# Patient Record
Sex: FEMALE | Race: WHITE | Employment: UNEMPLOYED | URBAN - NONMETROPOLITAN AREA
[De-identification: names, ages, dates, MRNs, and addresses within clinical notes are randomized per-mention and may not be internally consistent; named-entity substitution may affect disease eponyms.]

---
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
## 2023-05-12 ENCOUNTER — TELEPHONE (OUTPATIENT)
Dept: PEDIATRICS | Age: 18
End: 2023-05-12

## 2023-05-12 NOTE — TELEPHONE ENCOUNTER
I called [employer] to check on if a prior auth was needed for [id number]/[id number]. Through the automated system it stated that: \"if physician is In Network - no prior auth was needed\"  I checked with Dr Sumner! [facility] office and he is In Network for [employer]. Call reference # is [id number].

## 2023-05-18 ENCOUNTER — HOSPITAL ENCOUNTER (OUTPATIENT)
Dept: PEDIATRICS | Age: 18
Discharge: HOME | End: 2023-05-18
Payer: COMMERCIAL

## 2023-05-18 VITALS
DIASTOLIC BLOOD PRESSURE: 60 MMHG | HEART RATE: 68 BPM | BODY MASS INDEX: 27.14 KG/M2 | TEMPERATURE: 97.4 F | OXYGEN SATURATION: 98 % | SYSTOLIC BLOOD PRESSURE: 126 MMHG | HEIGHT: 63 IN | WEIGHT: 153.2 LBS | RESPIRATION RATE: 14 BRPM

## 2023-05-18 DIAGNOSIS — Q25.1 VSD (VENTRICULAR SEPTAL DEFECT) AND COARCTATION OF AORTA: Primary | ICD-10-CM

## 2023-05-18 DIAGNOSIS — Q21.0 VSD (VENTRICULAR SEPTAL DEFECT) AND COARCTATION OF AORTA: Primary | ICD-10-CM

## 2023-05-18 PROCEDURE — 93005 ELECTROCARDIOGRAM TRACING: CPT | Performed by: INTERNAL MEDICINE

## 2023-05-18 PROCEDURE — 93303 ECHO TRANSTHORACIC: CPT

## 2023-05-18 PROCEDURE — 93325 DOPPLER ECHO COLOR FLOW MAPG: CPT

## 2023-05-18 PROCEDURE — 99212 OFFICE O/P EST SF 10 MIN: CPT

## 2023-05-18 PROCEDURE — 93320 DOPPLER ECHO COMPLETE: CPT

## 2023-05-18 RX ORDER — NORGESTIMATE AND ETHINYL ESTRADIOL 0.25-0.035
1 KIT ORAL DAILY
COMMUNITY
Start: 2023-03-11

## 2023-05-18 NOTE — DISCHARGE INSTRUCTIONS
Contact information: If you need to reach Dr. Margaret Hopkins for questions or concerns please call our office at [phone] (M-F 8am-5pm). After hours or on the weekends call [phone] and ask for the Adult Cardiologist on call.    Continue care with your PCP  You will have an EKG/ECHO @ return visit  Medications: Continue as you are  Yes an antibiotic is needed prior to any dental work  Return visit is scheduled in 2 years

## 2023-05-18 NOTE — PROGRESS NOTES
[address] Adult Congenital Heart () Program Clinic Note     History of Present Illness  We had the pleasure of seeing Rosa Evans in the [facility] [address] [facility] at [facility] today. Kentrell Velez is a 19 y.o. old female with a history of VSD s/p repair and coarctation s/p repair ([facility] with Dr. Jame Chávez, 6 mo of age). She was previously followed by Dr. Shayla Guzmán ([facility] in [facility] [place])  We first met Kentrell Velez in January of 2020, when she transitioned to [facility] care. We last evaluated her in 6/2021, she presents today for routine cardiology follow up. Interval Hx:  Since her last visit, she has been doing well. She denies any ED visits, hospitalizations or significant illnesses. She does not have any new cardiovascular concerns or symptoms. She denies symptoms of chest pain, palpitations, shortness of breath at rest, dyspnea on exertion, PND, orthopnea, syncope, pre-syncope, lightheadedness, dizziness, or peripheral edema.      Past Medical History:   Diagnosis Date    Coarctation of aorta     Heart murmur     Hyperglycemia     VSD (ventricular septal defect)       Past Surgical History:   Procedure Laterality Date    CARDIAC SURGERY      Repair VSD-coarct 6/2003    TYMPANOSTOMY TUBE PLACEMENT      @ 1years of age      Family History   Problem Relation Age of Onset    High Cholesterol Mother     Heart Disease Mother         Cardiomegally, 3 open heart surgeries, valve replacement, A Fib, pacemaker    Heart Failure Mother         CHF    High Blood Pressure Father     High Blood Pressure Maternal Grandmother     Heart Disease Maternal Grandfather         \" maker\" 2 stints placed    High Cholesterol Maternal Grandfather     Cancer Maternal Grandfather         prostate cancer    High Blood Pressure Maternal Grandfather     Coronary Art Dis Maternal Grandfather     High Blood Pressure Paternal Grandmother     Diabetes Paternal

## 2023-05-20 LAB
EKG ATRIAL RATE: 68 BPM
EKG P AXIS: -21 DEGREES
EKG P-R INTERVAL: 152 MS
EKG Q-T INTERVAL: 402 MS
EKG QRS DURATION: 84 MS
EKG QTC CALCULATION (BAZETT): 427 MS
EKG R AXIS: 64 DEGREES
EKG T AXIS: 20 DEGREES
EKG VENTRICULAR RATE: 68 BPM

## 2025-05-19 ENCOUNTER — TELEPHONE (OUTPATIENT)
Dept: PEDIATRICS | Age: 20
End: 2025-05-19

## 2025-05-19 NOTE — TELEPHONE ENCOUNTER
I left a VM for mother to have patient contact me. I need to notify her of Dr Salcido no longer coming and will see CNP (if willing) I also need to have her come in early for her echo.

## 2025-05-20 ENCOUNTER — TELEPHONE (OUTPATIENT)
Dept: PEDIATRICS | Age: 20
End: 2025-05-20

## 2025-05-20 NOTE — TELEPHONE ENCOUNTER
I spoke with Abigail from [facility] to see if prior auth is needed for an ECHO/[id number] or [id number]. She said no prior auth is needed. Call reference # [id number].

## 2025-06-18 NOTE — PROGRESS NOTES
[facility] Adult Congenital Heart () Program Office Visit - Follow Up at [facility]     HPI  We had the pleasure of seeing Bobbi Keller in the [facility] Adult Congenital Heart [facility] outreach clinic at [facility] today. Bobbi is a 20 y.o. old female with a history of VSD s/p repair and coarctation s/p repair ([facility] with Dr. Blake, 6 mo of age). She was previously followed by Dr. Schneider ([facility] in [place])  We first met Bobbi in January of 2020, when she transitioned to [facility] care. We last evaluated her in 6/2023, she presents today for routine cardiology follow up.    Interval History   Went to [place] recently and leaves tomorrow for [place]. Bobbi reports she feels well since her last visit with no new symptoms or concerns. Specifically, Bobbi Keller otherwise denies symptoms of chest pain shortness of breath, dyspnea on exertion, PND, orthopnea, syncope, pre-syncope, lightheadedness, dizziness, claudication or peripheral edema.  Bobbi Keller denies any interval hospitalizations, ER visits, changes to their overall health, significant illness or non-cardiac surgical procedures. She has dizziness related to hypoglycemia. She occasionally checks BP's at home and shows her parents the BP monitor and report to her its normal. She goes to the dentist routinely, currently has braces.     Past Medical History:   Diagnosis Date    Coarctation of aorta     Heart murmur     Hyperglycemia     VSD (ventricular septal defect)       Past Surgical History:   Procedure Laterality Date    CARDIAC SURGERY      Repair VSD-coarct 6/2003    TYMPANOSTOMY TUBE PLACEMENT      @ 2 years of age      Family History   Problem Relation Age of Onset    High Cholesterol Mother     Heart Disease Mother         Cardiomegally, 3 open heart surgeries, valve replacement, A Fib, pacemaker    Heart Failure Mother         CHF    High Blood

## 2025-06-19 ENCOUNTER — HOSPITAL ENCOUNTER (OUTPATIENT)
Age: 20
Discharge: HOME | End: 2025-06-21
Payer: COMMERCIAL

## 2025-06-19 ENCOUNTER — HOSPITAL ENCOUNTER (OUTPATIENT)
Dept: PEDIATRICS | Age: 20
Discharge: HOME | End: 2025-06-19
Payer: COMMERCIAL

## 2025-06-19 VITALS
TEMPERATURE: 97.4 F | WEIGHT: 160.6 LBS | HEIGHT: 63 IN | DIASTOLIC BLOOD PRESSURE: 63 MMHG | RESPIRATION RATE: 16 BRPM | BODY MASS INDEX: 28.46 KG/M2 | HEART RATE: 67 BPM | OXYGEN SATURATION: 99 % | SYSTOLIC BLOOD PRESSURE: 131 MMHG

## 2025-06-19 DIAGNOSIS — Q25.1 VSD (VENTRICULAR SEPTAL DEFECT) AND COARCTATION OF AORTA: Primary | ICD-10-CM

## 2025-06-19 DIAGNOSIS — Q25.1 VSD (VENTRICULAR SEPTAL DEFECT) AND COARCTATION OF AORTA: ICD-10-CM

## 2025-06-19 DIAGNOSIS — R73.9 HYPERGLYCEMIA: ICD-10-CM

## 2025-06-19 DIAGNOSIS — Q21.0 VSD (VENTRICULAR SEPTAL DEFECT) AND COARCTATION OF AORTA: ICD-10-CM

## 2025-06-19 DIAGNOSIS — Q21.0 VSD (VENTRICULAR SEPTAL DEFECT) AND COARCTATION OF AORTA: Primary | ICD-10-CM

## 2025-06-19 PROCEDURE — 99212 OFFICE O/P EST SF 10 MIN: CPT

## 2025-06-19 PROCEDURE — 93005 ELECTROCARDIOGRAM TRACING: CPT | Performed by: NURSE PRACTITIONER

## 2025-06-19 PROCEDURE — 93325 DOPPLER ECHO COLOR FLOW MAPG: CPT

## 2025-06-19 NOTE — DISCHARGE INSTRUCTIONS
Contact information: If you need to reach [facility] for questions or concerns please call our office at [phone] (M-F 8am-5pm). After hours or on the weekends call [phone] and ask for the Adult Cardiologist on call.   Continue care with your PCP  You will have an EKG/ECHO @ return visit  Medications: Continue as you are  No antibiotic is needed prior to any dental work  Return visit is scheduled IN 2 YEARS

## 2025-06-19 NOTE — PLAN OF CARE
Provider discussed disease process, treatment plan, medications,and discharge instructions.  Patient and grandparent agrees with plan.  Any questions were answered.  Care plan reviewed with patient and grandparent.  Goal: No falls during the visit, achieved.

## 2025-06-20 LAB
EKG ATRIAL RATE: 68 BPM
EKG P-R INTERVAL: 158 MS
EKG Q-T INTERVAL: 398 MS
EKG QRS DURATION: 88 MS
EKG QTC CALCULATION (BAZETT): 423 MS
EKG R AXIS: 68 DEGREES
EKG T AXIS: 38 DEGREES
EKG VENTRICULAR RATE: 68 BPM